# Patient Record
Sex: FEMALE | Race: WHITE
[De-identification: names, ages, dates, MRNs, and addresses within clinical notes are randomized per-mention and may not be internally consistent; named-entity substitution may affect disease eponyms.]

---

## 2020-08-02 ENCOUNTER — HOSPITAL ENCOUNTER (INPATIENT)
Dept: HOSPITAL 19 - MEDICAL | Age: 85
LOS: 11 days | Discharge: HOME HEALTH | DRG: 336 | End: 2020-08-13
Attending: INTERNAL MEDICINE | Admitting: INTERNAL MEDICINE
Payer: MEDICARE

## 2020-08-02 VITALS — DIASTOLIC BLOOD PRESSURE: 67 MMHG | TEMPERATURE: 98.7 F | SYSTOLIC BLOOD PRESSURE: 139 MMHG | HEART RATE: 80 BPM

## 2020-08-02 VITALS — DIASTOLIC BLOOD PRESSURE: 60 MMHG | SYSTOLIC BLOOD PRESSURE: 152 MMHG | TEMPERATURE: 99.5 F | HEART RATE: 84 BPM

## 2020-08-02 VITALS — DIASTOLIC BLOOD PRESSURE: 45 MMHG | HEART RATE: 86 BPM | SYSTOLIC BLOOD PRESSURE: 134 MMHG

## 2020-08-02 VITALS — HEIGHT: 60.98 IN | WEIGHT: 142.42 LBS | BODY MASS INDEX: 26.89 KG/M2

## 2020-08-02 VITALS — HEART RATE: 84 BPM | DIASTOLIC BLOOD PRESSURE: 98 MMHG | SYSTOLIC BLOOD PRESSURE: 156 MMHG

## 2020-08-02 VITALS — HEART RATE: 88 BPM | DIASTOLIC BLOOD PRESSURE: 43 MMHG | SYSTOLIC BLOOD PRESSURE: 158 MMHG

## 2020-08-02 VITALS — SYSTOLIC BLOOD PRESSURE: 146 MMHG | DIASTOLIC BLOOD PRESSURE: 45 MMHG | HEART RATE: 83 BPM

## 2020-08-02 VITALS — HEART RATE: 82 BPM | TEMPERATURE: 99.8 F | SYSTOLIC BLOOD PRESSURE: 159 MMHG | DIASTOLIC BLOOD PRESSURE: 58 MMHG

## 2020-08-02 VITALS — DIASTOLIC BLOOD PRESSURE: 48 MMHG | SYSTOLIC BLOOD PRESSURE: 140 MMHG | HEART RATE: 97 BPM

## 2020-08-02 DIAGNOSIS — E44.0: ICD-10-CM

## 2020-08-02 DIAGNOSIS — I10: ICD-10-CM

## 2020-08-02 DIAGNOSIS — E03.9: ICD-10-CM

## 2020-08-02 DIAGNOSIS — Z88.5: ICD-10-CM

## 2020-08-02 DIAGNOSIS — E78.5: ICD-10-CM

## 2020-08-02 DIAGNOSIS — Z88.0: ICD-10-CM

## 2020-08-02 DIAGNOSIS — Z90.710: ICD-10-CM

## 2020-08-02 DIAGNOSIS — I48.91: ICD-10-CM

## 2020-08-02 DIAGNOSIS — F41.9: ICD-10-CM

## 2020-08-02 DIAGNOSIS — Z90.49: ICD-10-CM

## 2020-08-02 DIAGNOSIS — Z88.2: ICD-10-CM

## 2020-08-02 DIAGNOSIS — N63.0: ICD-10-CM

## 2020-08-02 DIAGNOSIS — Z66: ICD-10-CM

## 2020-08-02 DIAGNOSIS — Z88.1: ICD-10-CM

## 2020-08-02 DIAGNOSIS — K56.52: Primary | ICD-10-CM

## 2020-08-02 DIAGNOSIS — J45.909: ICD-10-CM

## 2020-08-02 DIAGNOSIS — Z85.43: ICD-10-CM

## 2020-08-02 DIAGNOSIS — K56.7: ICD-10-CM

## 2020-08-02 DIAGNOSIS — K59.00: ICD-10-CM

## 2020-08-02 PROCEDURE — 0DNW0ZZ RELEASE PERITONEUM, OPEN APPROACH: ICD-10-PCS | Performed by: SURGERY

## 2020-08-02 PROCEDURE — A4314 CATH W/DRAINAGE 2-WAY LATEX: HCPCS

## 2020-08-02 PROCEDURE — A9284 NON-ELECTRONIC SPIROMETER: HCPCS

## 2020-08-02 PROCEDURE — C1751 CATH, INF, PER/CENT/MIDLINE: HCPCS

## 2020-08-03 VITALS — SYSTOLIC BLOOD PRESSURE: 141 MMHG | DIASTOLIC BLOOD PRESSURE: 58 MMHG | HEART RATE: 76 BPM | TEMPERATURE: 99.1 F

## 2020-08-03 VITALS — DIASTOLIC BLOOD PRESSURE: 59 MMHG | HEART RATE: 81 BPM | TEMPERATURE: 98.1 F | SYSTOLIC BLOOD PRESSURE: 116 MMHG

## 2020-08-03 VITALS — HEART RATE: 72 BPM | SYSTOLIC BLOOD PRESSURE: 126 MMHG | TEMPERATURE: 98.7 F | DIASTOLIC BLOOD PRESSURE: 48 MMHG

## 2020-08-03 VITALS — HEART RATE: 88 BPM | TEMPERATURE: 100 F | SYSTOLIC BLOOD PRESSURE: 153 MMHG | DIASTOLIC BLOOD PRESSURE: 49 MMHG

## 2020-08-03 VITALS — DIASTOLIC BLOOD PRESSURE: 50 MMHG | SYSTOLIC BLOOD PRESSURE: 147 MMHG | TEMPERATURE: 99.2 F | HEART RATE: 82 BPM

## 2020-08-03 VITALS — TEMPERATURE: 98.5 F | SYSTOLIC BLOOD PRESSURE: 161 MMHG | HEART RATE: 87 BPM | DIASTOLIC BLOOD PRESSURE: 37 MMHG

## 2020-08-03 VITALS — HEART RATE: 75 BPM | TEMPERATURE: 98.6 F | SYSTOLIC BLOOD PRESSURE: 130 MMHG | DIASTOLIC BLOOD PRESSURE: 49 MMHG

## 2020-08-03 VITALS — TEMPERATURE: 98.8 F | SYSTOLIC BLOOD PRESSURE: 135 MMHG | HEART RATE: 82 BPM | DIASTOLIC BLOOD PRESSURE: 44 MMHG

## 2020-08-03 LAB
ALBUMIN SERPL-MCNC: 3.4 GM/DL (ref 3.5–5)
ALP SERPL-CCNC: 69 U/L (ref 50–136)
ALT SERPL-CCNC: 18 U/L (ref 4–34)
ANION GAP SERPL CALC-SCNC: 6 MMOL/L (ref 7–16)
AST SERPL-CCNC: 48 U/L (ref 15–37)
BASOPHILS # BLD: 0 10*3/UL (ref 0–0.2)
BASOPHILS NFR BLD AUTO: 0.1 % (ref 0–2)
BILIRUB SERPL-MCNC: 0.6 MG/DL (ref 0–1)
BUN SERPL-MCNC: 18 MG/DL (ref 7–17)
CALCIUM SERPL-MCNC: 8.3 MG/DL (ref 8.4–10.2)
CHLORIDE SERPL-SCNC: 108 MMOL/L (ref 98–107)
CO2 SERPL-SCNC: 23 MMOL/L (ref 22–30)
CREAT SERPL-SCNC: 0.94 UMOL/L (ref 0.52–1.25)
EOSINOPHIL # BLD: 0 10*3/UL (ref 0–0.7)
EOSINOPHIL NFR BLD: 0 % (ref 0–4)
ERYTHROCYTE [DISTWIDTH] IN BLOOD BY AUTOMATED COUNT: 14 % (ref 11.5–14.5)
GLUCOSE SERPL-MCNC: 144 MG/DL (ref 74–106)
GRANULOCYTES # BLD AUTO: 85.4 % (ref 42.2–75.2)
HCT VFR BLD AUTO: 36.5 % (ref 37–47)
HGB BLD-MCNC: 11.6 G/DL (ref 12.5–16)
LYMPHOCYTES # BLD: 0.8 10*3/UL (ref 1.2–3.4)
LYMPHOCYTES NFR BLD: 5.9 % (ref 20–51)
MAGNESIUM SERPL-MCNC: 2 MG/DL (ref 1.6–2.3)
MCH RBC QN AUTO: 27 PG (ref 27–31)
MCHC RBC AUTO-ENTMCNC: 32 G/DL (ref 33–37)
MCV RBC AUTO: 85 FL (ref 80–100)
MONOCYTES # BLD: 1.1 10*3/UL (ref 0.1–0.6)
MONOCYTES NFR BLD AUTO: 8.2 % (ref 1.7–9.3)
NEUTROPHILS # BLD: 11.6 10*3/UL (ref 1.4–6.5)
PLATELET # BLD AUTO: 211 K/MM3 (ref 130–400)
PMV BLD AUTO: 11.3 FL (ref 7.4–10.4)
POTASSIUM SERPL-SCNC: 4.1 MMOL/L (ref 3.4–5)
PROT SERPL-MCNC: 6 GM/DL (ref 6.4–8.2)
RBC # BLD AUTO: 4.31 M/MM3 (ref 4.1–5.3)
SODIUM SERPL-SCNC: 137 MMOL/L (ref 137–145)

## 2020-08-04 VITALS — DIASTOLIC BLOOD PRESSURE: 58 MMHG | HEART RATE: 123 BPM | TEMPERATURE: 99.6 F | SYSTOLIC BLOOD PRESSURE: 127 MMHG

## 2020-08-04 VITALS — TEMPERATURE: 98.8 F | DIASTOLIC BLOOD PRESSURE: 53 MMHG | HEART RATE: 81 BPM | SYSTOLIC BLOOD PRESSURE: 130 MMHG

## 2020-08-04 VITALS — SYSTOLIC BLOOD PRESSURE: 118 MMHG | HEART RATE: 80 BPM | DIASTOLIC BLOOD PRESSURE: 53 MMHG | TEMPERATURE: 98.1 F

## 2020-08-04 VITALS — TEMPERATURE: 98.3 F | SYSTOLIC BLOOD PRESSURE: 140 MMHG | DIASTOLIC BLOOD PRESSURE: 74 MMHG | HEART RATE: 77 BPM

## 2020-08-04 VITALS — HEART RATE: 79 BPM

## 2020-08-04 VITALS — TEMPERATURE: 99.2 F | SYSTOLIC BLOOD PRESSURE: 119 MMHG | HEART RATE: 103 BPM | DIASTOLIC BLOOD PRESSURE: 58 MMHG

## 2020-08-04 VITALS — DIASTOLIC BLOOD PRESSURE: 61 MMHG | HEART RATE: 84 BPM | TEMPERATURE: 99.6 F | SYSTOLIC BLOOD PRESSURE: 127 MMHG

## 2020-08-04 LAB
ANION GAP SERPL CALC-SCNC: 5 MMOL/L (ref 7–16)
BASOPHILS # BLD: 0.1 10*3/UL (ref 0–0.2)
BASOPHILS NFR BLD AUTO: 0.7 % (ref 0–2)
BUN SERPL-MCNC: 20 MG/DL (ref 7–17)
CALCIUM SERPL-MCNC: 8.3 MG/DL (ref 8.4–10.2)
CHLORIDE SERPL-SCNC: 104 MMOL/L (ref 98–107)
CO2 SERPL-SCNC: 26 MMOL/L (ref 22–30)
CREAT SERPL-SCNC: 0.89 UMOL/L (ref 0.52–1.25)
EOSINOPHIL # BLD: 0.1 10*3/UL (ref 0–0.7)
EOSINOPHIL NFR BLD: 0.8 % (ref 0–4)
ERYTHROCYTE [DISTWIDTH] IN BLOOD BY AUTOMATED COUNT: 14.2 % (ref 11.5–14.5)
GLUCOSE SERPL-MCNC: 97 MG/DL (ref 74–106)
GRANULOCYTES # BLD AUTO: 68.3 % (ref 42.2–75.2)
HCT VFR BLD AUTO: 34.5 % (ref 37–47)
HGB BLD-MCNC: 10.6 G/DL (ref 12.5–16)
LYMPHOCYTES # BLD: 1.5 10*3/UL (ref 1.2–3.4)
LYMPHOCYTES NFR BLD: 19.3 % (ref 20–51)
MCH RBC QN AUTO: 27 PG (ref 27–31)
MCHC RBC AUTO-ENTMCNC: 31 G/DL (ref 33–37)
MCV RBC AUTO: 87 FL (ref 80–100)
MONOCYTES # BLD: 0.8 10*3/UL (ref 0.1–0.6)
MONOCYTES NFR BLD AUTO: 10.6 % (ref 1.7–9.3)
NEUTROPHILS # BLD: 5.3 10*3/UL (ref 1.4–6.5)
PLATELET # BLD AUTO: 180 K/MM3 (ref 130–400)
PMV BLD AUTO: 10.7 FL (ref 7.4–10.4)
POTASSIUM SERPL-SCNC: 4.2 MMOL/L (ref 3.4–5)
RBC # BLD AUTO: 3.98 M/MM3 (ref 4.1–5.3)
SODIUM SERPL-SCNC: 135 MMOL/L (ref 137–145)

## 2020-08-05 VITALS — DIASTOLIC BLOOD PRESSURE: 63 MMHG | SYSTOLIC BLOOD PRESSURE: 173 MMHG | TEMPERATURE: 99.1 F | HEART RATE: 89 BPM

## 2020-08-05 VITALS — SYSTOLIC BLOOD PRESSURE: 185 MMHG | TEMPERATURE: 100.3 F | DIASTOLIC BLOOD PRESSURE: 73 MMHG | HEART RATE: 87 BPM

## 2020-08-05 VITALS — HEART RATE: 77 BPM | TEMPERATURE: 99 F | SYSTOLIC BLOOD PRESSURE: 155 MMHG | DIASTOLIC BLOOD PRESSURE: 64 MMHG

## 2020-08-05 VITALS — TEMPERATURE: 99.1 F | DIASTOLIC BLOOD PRESSURE: 64 MMHG | HEART RATE: 91 BPM | SYSTOLIC BLOOD PRESSURE: 176 MMHG

## 2020-08-05 VITALS — HEART RATE: 84 BPM | SYSTOLIC BLOOD PRESSURE: 165 MMHG | TEMPERATURE: 99.8 F | DIASTOLIC BLOOD PRESSURE: 54 MMHG

## 2020-08-05 VITALS — DIASTOLIC BLOOD PRESSURE: 56 MMHG | TEMPERATURE: 98.7 F | SYSTOLIC BLOOD PRESSURE: 139 MMHG | HEART RATE: 88 BPM

## 2020-08-05 VITALS — TEMPERATURE: 99.1 F

## 2020-08-05 LAB
ANION GAP SERPL CALC-SCNC: 5 MMOL/L (ref 7–16)
BASOPHILS # BLD: 0 10*3/UL (ref 0–0.2)
BASOPHILS NFR BLD AUTO: 0.4 % (ref 0–2)
BUN SERPL-MCNC: 22 MG/DL (ref 7–17)
CALCIUM SERPL-MCNC: 8.4 MG/DL (ref 8.4–10.2)
CHLORIDE SERPL-SCNC: 102 MMOL/L (ref 98–107)
CO2 SERPL-SCNC: 26 MMOL/L (ref 22–30)
CREAT SERPL-SCNC: 0.96 UMOL/L (ref 0.52–1.25)
EOSINOPHIL # BLD: 0.1 10*3/UL (ref 0–0.7)
EOSINOPHIL NFR BLD: 0.7 % (ref 0–4)
ERYTHROCYTE [DISTWIDTH] IN BLOOD BY AUTOMATED COUNT: 13.9 % (ref 11.5–14.5)
GLUCOSE SERPL-MCNC: 126 MG/DL (ref 74–106)
GRANULOCYTES # BLD AUTO: 74.6 % (ref 42.2–75.2)
HCT VFR BLD AUTO: 37.7 % (ref 37–47)
HGB BLD-MCNC: 11.6 G/DL (ref 12.5–16)
LYMPHOCYTES # BLD: 0.9 10*3/UL (ref 1.2–3.4)
LYMPHOCYTES NFR BLD: 12.6 % (ref 20–51)
MCH RBC QN AUTO: 26 PG (ref 27–31)
MCHC RBC AUTO-ENTMCNC: 31 G/DL (ref 33–37)
MCV RBC AUTO: 86 FL (ref 80–100)
MONOCYTES # BLD: 0.8 10*3/UL (ref 0.1–0.6)
MONOCYTES NFR BLD AUTO: 11.4 % (ref 1.7–9.3)
NEUTROPHILS # BLD: 5.2 10*3/UL (ref 1.4–6.5)
PLATELET # BLD AUTO: 209 K/MM3 (ref 130–400)
PMV BLD AUTO: 10.8 FL (ref 7.4–10.4)
POTASSIUM SERPL-SCNC: 4.6 MMOL/L (ref 3.4–5)
RBC # BLD AUTO: 4.39 M/MM3 (ref 4.1–5.3)
SODIUM SERPL-SCNC: 134 MMOL/L (ref 137–145)

## 2020-08-06 VITALS — OXYGEN SATURATION: 89 %

## 2020-08-06 VITALS — OXYGEN SATURATION: 92 %

## 2020-08-06 VITALS — DIASTOLIC BLOOD PRESSURE: 85 MMHG | SYSTOLIC BLOOD PRESSURE: 119 MMHG | HEART RATE: 113 BPM | TEMPERATURE: 99.6 F

## 2020-08-06 VITALS — OXYGEN SATURATION: 90 %

## 2020-08-06 VITALS — OXYGEN SATURATION: 94 %

## 2020-08-06 VITALS — HEART RATE: 64 BPM | SYSTOLIC BLOOD PRESSURE: 148 MMHG | DIASTOLIC BLOOD PRESSURE: 56 MMHG | TEMPERATURE: 98 F

## 2020-08-06 VITALS — DIASTOLIC BLOOD PRESSURE: 77 MMHG | HEART RATE: 98 BPM | SYSTOLIC BLOOD PRESSURE: 183 MMHG | TEMPERATURE: 98.9 F

## 2020-08-06 VITALS — OXYGEN SATURATION: 91 %

## 2020-08-06 VITALS — HEART RATE: 102 BPM | SYSTOLIC BLOOD PRESSURE: 138 MMHG | OXYGEN SATURATION: 95 % | DIASTOLIC BLOOD PRESSURE: 67 MMHG

## 2020-08-06 VITALS — OXYGEN SATURATION: 87 %

## 2020-08-06 VITALS
SYSTOLIC BLOOD PRESSURE: 159 MMHG | TEMPERATURE: 98.6 F | DIASTOLIC BLOOD PRESSURE: 89 MMHG | HEART RATE: 120 BPM | OXYGEN SATURATION: 92 %

## 2020-08-06 VITALS — DIASTOLIC BLOOD PRESSURE: 75 MMHG | HEART RATE: 83 BPM | TEMPERATURE: 99.9 F | SYSTOLIC BLOOD PRESSURE: 157 MMHG

## 2020-08-06 VITALS — OXYGEN SATURATION: 88 %

## 2020-08-06 VITALS — TEMPERATURE: 99.1 F | HEART RATE: 108 BPM | DIASTOLIC BLOOD PRESSURE: 57 MMHG | SYSTOLIC BLOOD PRESSURE: 145 MMHG

## 2020-08-06 VITALS — HEART RATE: 97 BPM | SYSTOLIC BLOOD PRESSURE: 149 MMHG | DIASTOLIC BLOOD PRESSURE: 65 MMHG | TEMPERATURE: 98.7 F

## 2020-08-06 VITALS — OXYGEN SATURATION: 93 %

## 2020-08-06 VITALS — OXYGEN SATURATION: 95 %

## 2020-08-06 VITALS — OXYGEN SATURATION: 96 %

## 2020-08-06 LAB
ANION GAP SERPL CALC-SCNC: 11 MMOL/L (ref 7–16)
BASOPHILS # BLD: 0 10*3/UL (ref 0–0.2)
BASOPHILS NFR BLD AUTO: 0.3 % (ref 0–2)
BUN SERPL-MCNC: 26 MG/DL (ref 7–17)
CALCIUM SERPL-MCNC: 8.4 MG/DL (ref 8.4–10.2)
CHLORIDE SERPL-SCNC: 101 MMOL/L (ref 98–107)
CO2 SERPL-SCNC: 24 MMOL/L (ref 22–30)
CREAT SERPL-SCNC: 0.88 UMOL/L (ref 0.52–1.25)
EOSINOPHIL # BLD: 0 10*3/UL (ref 0–0.7)
EOSINOPHIL NFR BLD: 0 % (ref 0–4)
ERYTHROCYTE [DISTWIDTH] IN BLOOD BY AUTOMATED COUNT: 13.5 % (ref 11.5–14.5)
GASTROCULT GAST QL: POSITIVE
GLUCOSE SERPL-MCNC: 140 MG/DL (ref 74–106)
GRANULOCYTES # BLD AUTO: 89.8 % (ref 42.2–75.2)
HCT VFR BLD AUTO: 38.8 % (ref 37–47)
HGB BLD-MCNC: 12.6 G/DL (ref 12.5–16)
LYMPHOCYTES # BLD: 0.5 10*3/UL (ref 1.2–3.4)
LYMPHOCYTES NFR BLD: 3.8 % (ref 20–51)
MCH RBC QN AUTO: 28 PG (ref 27–31)
MCHC RBC AUTO-ENTMCNC: 33 G/DL (ref 33–37)
MCV RBC AUTO: 85 FL (ref 80–100)
MONOCYTES # BLD: 0.8 10*3/UL (ref 0.1–0.6)
MONOCYTES NFR BLD AUTO: 5.7 % (ref 1.7–9.3)
NEUTROPHILS # BLD: 12.4 10*3/UL (ref 1.4–6.5)
PH GAST: 1 [PH]
PLATELET # BLD AUTO: 245 K/MM3 (ref 130–400)
PMV BLD AUTO: 10.1 FL (ref 7.4–10.4)
POTASSIUM SERPL-SCNC: 3.5 MMOL/L (ref 3.4–5)
RBC # BLD AUTO: 4.58 M/MM3 (ref 4.1–5.3)
SODIUM SERPL-SCNC: 137 MMOL/L (ref 137–145)

## 2020-08-06 PROCEDURE — 02HV33Z INSERTION OF INFUSION DEVICE INTO SUPERIOR VENA CAVA, PERCUTANEOUS APPROACH: ICD-10-PCS

## 2020-08-06 PROCEDURE — 3E0436Z INTRODUCTION OF NUTRITIONAL SUBSTANCE INTO CENTRAL VEIN, PERCUTANEOUS APPROACH: ICD-10-PCS

## 2020-08-07 VITALS — OXYGEN SATURATION: 96 %

## 2020-08-07 VITALS — SYSTOLIC BLOOD PRESSURE: 154 MMHG | HEART RATE: 80 BPM | DIASTOLIC BLOOD PRESSURE: 68 MMHG | TEMPERATURE: 98 F

## 2020-08-07 VITALS — OXYGEN SATURATION: 95 %

## 2020-08-07 VITALS — OXYGEN SATURATION: 93 %

## 2020-08-07 VITALS — OXYGEN SATURATION: 91 %

## 2020-08-07 VITALS — OXYGEN SATURATION: 94 %

## 2020-08-07 VITALS — OXYGEN SATURATION: 92 %

## 2020-08-07 VITALS — OXYGEN SATURATION: 97 %

## 2020-08-07 VITALS — OXYGEN SATURATION: 90 %

## 2020-08-07 VITALS — OXYGEN SATURATION: 100 %

## 2020-08-07 VITALS — OXYGEN SATURATION: 87 %

## 2020-08-07 VITALS
DIASTOLIC BLOOD PRESSURE: 77 MMHG | HEART RATE: 103 BPM | TEMPERATURE: 98.5 F | OXYGEN SATURATION: 92 % | SYSTOLIC BLOOD PRESSURE: 132 MMHG

## 2020-08-07 VITALS — TEMPERATURE: 97.7 F | HEART RATE: 80 BPM | DIASTOLIC BLOOD PRESSURE: 78 MMHG | SYSTOLIC BLOOD PRESSURE: 164 MMHG

## 2020-08-07 VITALS — OXYGEN SATURATION: 89 %

## 2020-08-07 VITALS — OXYGEN SATURATION: 99 %

## 2020-08-07 VITALS — SYSTOLIC BLOOD PRESSURE: 159 MMHG | TEMPERATURE: 99 F | DIASTOLIC BLOOD PRESSURE: 70 MMHG | HEART RATE: 62 BPM

## 2020-08-07 VITALS — OXYGEN SATURATION: 88 %

## 2020-08-07 VITALS
TEMPERATURE: 98.7 F | DIASTOLIC BLOOD PRESSURE: 64 MMHG | SYSTOLIC BLOOD PRESSURE: 155 MMHG | OXYGEN SATURATION: 94 % | HEART RATE: 77 BPM

## 2020-08-07 VITALS — SYSTOLIC BLOOD PRESSURE: 159 MMHG | TEMPERATURE: 98.9 F | DIASTOLIC BLOOD PRESSURE: 70 MMHG | HEART RATE: 66 BPM

## 2020-08-07 VITALS — OXYGEN SATURATION: 98 %

## 2020-08-07 VITALS — OXYGEN SATURATION: 86 %

## 2020-08-07 LAB
ALBUMIN SERPL-MCNC: 2.9 GM/DL (ref 3.5–5)
ALP SERPL-CCNC: 64 U/L (ref 50–136)
ALT SERPL-CCNC: 17 U/L (ref 4–34)
ANION GAP SERPL CALC-SCNC: 5 MMOL/L (ref 7–16)
ANION GAP SERPL CALC-SCNC: 6 MMOL/L (ref 7–16)
AST SERPL-CCNC: 58 U/L (ref 15–37)
BASOPHILS # BLD: 0 10*3/UL (ref 0–0.2)
BASOPHILS NFR BLD AUTO: 0.3 % (ref 0–2)
BILIRUB SERPL-MCNC: 0.7 MG/DL (ref 0–1)
BUN SERPL-MCNC: 21 MG/DL (ref 7–17)
BUN SERPL-MCNC: 21 MG/DL (ref 7–17)
CALCIUM SERPL-MCNC: 7.9 MG/DL (ref 8.4–10.2)
CALCIUM SERPL-MCNC: 8.1 MG/DL (ref 8.4–10.2)
CHLORIDE SERPL-SCNC: 100 MMOL/L (ref 98–107)
CHLORIDE SERPL-SCNC: 99 MMOL/L (ref 98–107)
CHOLEST SPEC-SCNC: 102 MG/DL (ref 120–200)
CO2 SERPL-SCNC: 29 MMOL/L (ref 22–30)
CO2 SERPL-SCNC: 30 MMOL/L (ref 22–30)
CREAT SERPL-SCNC: 0.77 UMOL/L (ref 0.52–1.25)
CREAT SERPL-SCNC: 0.79 UMOL/L (ref 0.52–1.25)
EOSINOPHIL # BLD: 0 10*3/UL (ref 0–0.7)
EOSINOPHIL NFR BLD: 0.3 % (ref 0–4)
ERYTHROCYTE [DISTWIDTH] IN BLOOD BY AUTOMATED COUNT: 13.4 % (ref 11.5–14.5)
GLUCOSE SERPL-MCNC: 102 MG/DL (ref 74–106)
GLUCOSE SERPL-MCNC: 108 MG/DL (ref 74–106)
GRANULOCYTES # BLD AUTO: 77.9 % (ref 42.2–75.2)
HCT VFR BLD AUTO: 34.7 % (ref 37–47)
HGB BLD-MCNC: 11 G/DL (ref 12.5–16)
LYMPHOCYTES # BLD: 1.1 10*3/UL (ref 1.2–3.4)
LYMPHOCYTES NFR BLD: 9.8 % (ref 20–51)
MCH RBC QN AUTO: 27 PG (ref 27–31)
MCHC RBC AUTO-ENTMCNC: 32 G/DL (ref 33–37)
MCV RBC AUTO: 85 FL (ref 80–100)
MONOCYTES # BLD: 1.2 10*3/UL (ref 0.1–0.6)
MONOCYTES NFR BLD AUTO: 11.3 % (ref 1.7–9.3)
NEUTROPHILS # BLD: 8.4 10*3/UL (ref 1.4–6.5)
PHOSPHATE SERPL-MCNC: 2.9 MG/DL (ref 2.5–4.5)
PLATELET # BLD AUTO: 207 K/MM3 (ref 130–400)
PMV BLD AUTO: 10.4 FL (ref 7.4–10.4)
POTASSIUM SERPL-SCNC: 3.7 MMOL/L (ref 3.4–5)
POTASSIUM SERPL-SCNC: 3.8 MMOL/L (ref 3.4–5)
PRE ALBUMIN: 9.3 MG/DL (ref 17.6–36)
PROT SERPL-MCNC: 5.2 GM/DL (ref 6.4–8.2)
RBC # BLD AUTO: 4.1 M/MM3 (ref 4.1–5.3)
SODIUM SERPL-SCNC: 134 MMOL/L (ref 137–145)
SODIUM SERPL-SCNC: 135 MMOL/L (ref 137–145)
TRIGL SERPL-MCNC: 97 MG/DL

## 2020-08-08 VITALS — OXYGEN SATURATION: 95 %

## 2020-08-08 VITALS — OXYGEN SATURATION: 93 %

## 2020-08-08 VITALS — OXYGEN SATURATION: 98 %

## 2020-08-08 VITALS — OXYGEN SATURATION: 96 %

## 2020-08-08 VITALS — OXYGEN SATURATION: 97 %

## 2020-08-08 VITALS — OXYGEN SATURATION: 99 %

## 2020-08-08 VITALS — OXYGEN SATURATION: 94 %

## 2020-08-08 VITALS — OXYGEN SATURATION: 100 %

## 2020-08-08 VITALS
TEMPERATURE: 98.8 F | HEART RATE: 59 BPM | SYSTOLIC BLOOD PRESSURE: 159 MMHG | DIASTOLIC BLOOD PRESSURE: 70 MMHG | OXYGEN SATURATION: 93 %

## 2020-08-08 VITALS
DIASTOLIC BLOOD PRESSURE: 71 MMHG | TEMPERATURE: 98.7 F | SYSTOLIC BLOOD PRESSURE: 168 MMHG | OXYGEN SATURATION: 94 % | HEART RATE: 61 BPM

## 2020-08-08 VITALS
HEART RATE: 78 BPM | DIASTOLIC BLOOD PRESSURE: 78 MMHG | SYSTOLIC BLOOD PRESSURE: 126 MMHG | OXYGEN SATURATION: 94 % | TEMPERATURE: 98 F

## 2020-08-08 VITALS — OXYGEN SATURATION: 92 %

## 2020-08-08 VITALS — OXYGEN SATURATION: 88 %

## 2020-08-08 VITALS
HEART RATE: 75 BPM | DIASTOLIC BLOOD PRESSURE: 78 MMHG | OXYGEN SATURATION: 93 % | TEMPERATURE: 97.8 F | SYSTOLIC BLOOD PRESSURE: 120 MMHG

## 2020-08-08 VITALS — TEMPERATURE: 98.5 F | HEART RATE: 60 BPM | SYSTOLIC BLOOD PRESSURE: 154 MMHG | DIASTOLIC BLOOD PRESSURE: 62 MMHG

## 2020-08-08 VITALS — OXYGEN SATURATION: 91 %

## 2020-08-08 VITALS — DIASTOLIC BLOOD PRESSURE: 61 MMHG | OXYGEN SATURATION: 97 % | SYSTOLIC BLOOD PRESSURE: 134 MMHG | HEART RATE: 61 BPM

## 2020-08-08 VITALS — OXYGEN SATURATION: 90 %

## 2020-08-08 VITALS — OXYGEN SATURATION: 85 %

## 2020-08-08 LAB
ANION GAP SERPL CALC-SCNC: 4 MMOL/L (ref 7–16)
BUN SERPL-MCNC: 20 MG/DL (ref 7–17)
CALCIUM SERPL-MCNC: 7.8 MG/DL (ref 8.4–10.2)
CHLORIDE SERPL-SCNC: 99 MMOL/L (ref 98–107)
CO2 SERPL-SCNC: 30 MMOL/L (ref 22–30)
CREAT SERPL-SCNC: 0.76 UMOL/L (ref 0.52–1.25)
GLUCOSE SERPL-MCNC: 123 MG/DL (ref 74–106)
MAGNESIUM SERPL-MCNC: 2.1 MG/DL (ref 1.6–2.3)
PHOSPHATE SERPL-MCNC: 2.6 MG/DL (ref 2.5–4.5)
POTASSIUM SERPL-SCNC: 4 MMOL/L (ref 3.4–5)
SODIUM SERPL-SCNC: 133 MMOL/L (ref 137–145)

## 2020-08-09 VITALS — OXYGEN SATURATION: 97 %

## 2020-08-09 VITALS — OXYGEN SATURATION: 95 %

## 2020-08-09 VITALS — OXYGEN SATURATION: 93 %

## 2020-08-09 VITALS — OXYGEN SATURATION: 96 %

## 2020-08-09 VITALS — OXYGEN SATURATION: 94 %

## 2020-08-09 VITALS
SYSTOLIC BLOOD PRESSURE: 151 MMHG | DIASTOLIC BLOOD PRESSURE: 64 MMHG | OXYGEN SATURATION: 97 % | TEMPERATURE: 98.2 F | HEART RATE: 61 BPM

## 2020-08-09 VITALS — TEMPERATURE: 98.7 F | DIASTOLIC BLOOD PRESSURE: 54 MMHG | HEART RATE: 75 BPM | SYSTOLIC BLOOD PRESSURE: 146 MMHG

## 2020-08-09 VITALS — OXYGEN SATURATION: 92 %

## 2020-08-09 VITALS — OXYGEN SATURATION: 98 %

## 2020-08-09 VITALS — SYSTOLIC BLOOD PRESSURE: 159 MMHG | HEART RATE: 69 BPM | DIASTOLIC BLOOD PRESSURE: 55 MMHG | TEMPERATURE: 98.4 F

## 2020-08-09 VITALS — OXYGEN SATURATION: 99 %

## 2020-08-09 VITALS — HEART RATE: 78 BPM | DIASTOLIC BLOOD PRESSURE: 84 MMHG | TEMPERATURE: 97.7 F | SYSTOLIC BLOOD PRESSURE: 148 MMHG

## 2020-08-09 VITALS — SYSTOLIC BLOOD PRESSURE: 131 MMHG | TEMPERATURE: 98.7 F | DIASTOLIC BLOOD PRESSURE: 63 MMHG | HEART RATE: 61 BPM

## 2020-08-09 VITALS — DIASTOLIC BLOOD PRESSURE: 68 MMHG | SYSTOLIC BLOOD PRESSURE: 156 MMHG | HEART RATE: 63 BPM | TEMPERATURE: 97.8 F

## 2020-08-09 LAB
ANION GAP SERPL CALC-SCNC: 2 MMOL/L (ref 7–16)
BUN SERPL-MCNC: 22 MG/DL (ref 7–17)
CALCIUM SERPL-MCNC: 7.4 MG/DL (ref 8.4–10.2)
CHLORIDE SERPL-SCNC: 102 MMOL/L (ref 98–107)
CO2 SERPL-SCNC: 30 MMOL/L (ref 22–30)
CREAT SERPL-SCNC: 0.72 UMOL/L (ref 0.52–1.25)
GLUCOSE SERPL-MCNC: 106 MG/DL (ref 74–106)
MAGNESIUM SERPL-MCNC: 2.2 MG/DL (ref 1.6–2.3)
PHOSPHATE SERPL-MCNC: 3.7 MG/DL (ref 2.5–4.5)
POTASSIUM SERPL-SCNC: 4.1 MMOL/L (ref 3.4–5)
SODIUM SERPL-SCNC: 133 MMOL/L (ref 137–145)

## 2020-08-10 VITALS — TEMPERATURE: 97.8 F | HEART RATE: 65 BPM | SYSTOLIC BLOOD PRESSURE: 139 MMHG | DIASTOLIC BLOOD PRESSURE: 51 MMHG

## 2020-08-10 VITALS — DIASTOLIC BLOOD PRESSURE: 50 MMHG | TEMPERATURE: 98.7 F | SYSTOLIC BLOOD PRESSURE: 142 MMHG | HEART RATE: 65 BPM

## 2020-08-10 VITALS — SYSTOLIC BLOOD PRESSURE: 158 MMHG | TEMPERATURE: 98.1 F | DIASTOLIC BLOOD PRESSURE: 55 MMHG | HEART RATE: 72 BPM

## 2020-08-10 VITALS — DIASTOLIC BLOOD PRESSURE: 54 MMHG | TEMPERATURE: 98.5 F | SYSTOLIC BLOOD PRESSURE: 146 MMHG | HEART RATE: 75 BPM

## 2020-08-10 VITALS — SYSTOLIC BLOOD PRESSURE: 154 MMHG | HEART RATE: 67 BPM | TEMPERATURE: 98.5 F | DIASTOLIC BLOOD PRESSURE: 50 MMHG

## 2020-08-10 VITALS — HEART RATE: 69 BPM | SYSTOLIC BLOOD PRESSURE: 155 MMHG | DIASTOLIC BLOOD PRESSURE: 51 MMHG | TEMPERATURE: 98.1 F

## 2020-08-10 VITALS — DIASTOLIC BLOOD PRESSURE: 57 MMHG | SYSTOLIC BLOOD PRESSURE: 147 MMHG | HEART RATE: 74 BPM | TEMPERATURE: 97.8 F

## 2020-08-10 LAB
ANION GAP SERPL CALC-SCNC: 2 MMOL/L (ref 7–16)
BUN SERPL-MCNC: 23 MG/DL (ref 7–17)
CALCIUM SERPL-MCNC: 7.7 MG/DL (ref 8.4–10.2)
CHLORIDE SERPL-SCNC: 103 MMOL/L (ref 98–107)
CO2 SERPL-SCNC: 29 MMOL/L (ref 22–30)
CREAT SERPL-SCNC: 0.76 UMOL/L (ref 0.52–1.25)
GLUCOSE SERPL-MCNC: 90 MG/DL (ref 74–106)
MAGNESIUM SERPL-MCNC: 2.2 MG/DL (ref 1.6–2.3)
PHOSPHATE SERPL-MCNC: 3.1 MG/DL (ref 2.5–4.5)
POTASSIUM SERPL-SCNC: 3.9 MMOL/L (ref 3.4–5)
SODIUM SERPL-SCNC: 135 MMOL/L (ref 137–145)

## 2020-08-11 VITALS — TEMPERATURE: 99 F | DIASTOLIC BLOOD PRESSURE: 54 MMHG | HEART RATE: 68 BPM | SYSTOLIC BLOOD PRESSURE: 137 MMHG

## 2020-08-11 VITALS — SYSTOLIC BLOOD PRESSURE: 169 MMHG | TEMPERATURE: 98.9 F | DIASTOLIC BLOOD PRESSURE: 55 MMHG | HEART RATE: 72 BPM

## 2020-08-11 VITALS — TEMPERATURE: 98.3 F | SYSTOLIC BLOOD PRESSURE: 161 MMHG | DIASTOLIC BLOOD PRESSURE: 47 MMHG | HEART RATE: 72 BPM

## 2020-08-11 VITALS — DIASTOLIC BLOOD PRESSURE: 51 MMHG | TEMPERATURE: 98.2 F | SYSTOLIC BLOOD PRESSURE: 137 MMHG | HEART RATE: 59 BPM

## 2020-08-11 VITALS — SYSTOLIC BLOOD PRESSURE: 139 MMHG | TEMPERATURE: 99.3 F | DIASTOLIC BLOOD PRESSURE: 51 MMHG | HEART RATE: 61 BPM

## 2020-08-11 VITALS — SYSTOLIC BLOOD PRESSURE: 138 MMHG | DIASTOLIC BLOOD PRESSURE: 84 MMHG | HEART RATE: 74 BPM | TEMPERATURE: 98.1 F

## 2020-08-11 LAB
ANION GAP SERPL CALC-SCNC: 3 MMOL/L (ref 7–16)
BUN SERPL-MCNC: 20 MG/DL (ref 7–17)
CALCIUM SERPL-MCNC: 7.5 MG/DL (ref 8.4–10.2)
CHLORIDE SERPL-SCNC: 106 MMOL/L (ref 98–107)
CO2 SERPL-SCNC: 26 MMOL/L (ref 22–30)
CREAT SERPL-SCNC: 0.73 UMOL/L (ref 0.52–1.25)
GLUCOSE SERPL-MCNC: 98 MG/DL (ref 74–106)
MAGNESIUM SERPL-MCNC: 2.2 MG/DL (ref 1.6–2.3)
PHOSPHATE SERPL-MCNC: 3.4 MG/DL (ref 2.5–4.5)
POTASSIUM SERPL-SCNC: 3.6 MMOL/L (ref 3.4–5)
SODIUM SERPL-SCNC: 134 MMOL/L (ref 137–145)

## 2020-08-12 VITALS — SYSTOLIC BLOOD PRESSURE: 131 MMHG | TEMPERATURE: 98.1 F | HEART RATE: 65 BPM | DIASTOLIC BLOOD PRESSURE: 44 MMHG

## 2020-08-12 VITALS — HEART RATE: 67 BPM | DIASTOLIC BLOOD PRESSURE: 51 MMHG | SYSTOLIC BLOOD PRESSURE: 149 MMHG | TEMPERATURE: 98.3 F

## 2020-08-12 VITALS — DIASTOLIC BLOOD PRESSURE: 62 MMHG | TEMPERATURE: 97.9 F | SYSTOLIC BLOOD PRESSURE: 146 MMHG | HEART RATE: 62 BPM

## 2020-08-12 VITALS — SYSTOLIC BLOOD PRESSURE: 137 MMHG | HEART RATE: 60 BPM | DIASTOLIC BLOOD PRESSURE: 64 MMHG | TEMPERATURE: 98.3 F

## 2020-08-12 VITALS — TEMPERATURE: 98.5 F | DIASTOLIC BLOOD PRESSURE: 49 MMHG | SYSTOLIC BLOOD PRESSURE: 138 MMHG | HEART RATE: 60 BPM

## 2020-08-12 LAB
ALBUMIN SERPL-MCNC: 2.6 GM/DL (ref 3.5–5)
ALP SERPL-CCNC: 62 U/L (ref 50–136)
ALT SERPL-CCNC: 24 U/L (ref 4–34)
ANION GAP SERPL CALC-SCNC: 4 MMOL/L (ref 7–16)
AST SERPL-CCNC: 27 U/L (ref 15–37)
BILIRUB SERPL-MCNC: 0.3 MG/DL (ref 0–1)
BUN SERPL-MCNC: 17 MG/DL (ref 7–17)
BURR CELLS BLD QL SMEAR: (no result)
CALCIUM SERPL-MCNC: 7.4 MG/DL (ref 8.4–10.2)
CHLORIDE SERPL-SCNC: 108 MMOL/L (ref 98–107)
CHOLEST SPEC-SCNC: 103 MG/DL (ref 120–200)
CO2 SERPL-SCNC: 23 MMOL/L (ref 22–30)
CREAT SERPL-SCNC: 0.71 UMOL/L (ref 0.52–1.25)
EOSINOPHIL NFR BLD: 5 % (ref 0–4)
ERYTHROCYTE [DISTWIDTH] IN BLOOD BY AUTOMATED COUNT: 14 % (ref 11.5–14.5)
GLUCOSE SERPL-MCNC: 87 MG/DL (ref 74–106)
HCT VFR BLD AUTO: 30.1 % (ref 37–47)
HGB BLD-MCNC: 9.4 G/DL (ref 12.5–16)
HYPOCHROMIA BLD QL SMEAR: (no result)
LYMPHOCYTES NFR BLD MANUAL: 29 % (ref 20–51)
MAGNESIUM SERPL-MCNC: 2.2 MG/DL (ref 1.6–2.3)
MCH RBC QN AUTO: 26 PG (ref 27–31)
MCHC RBC AUTO-ENTMCNC: 31 G/DL (ref 33–37)
MCV RBC AUTO: 85 FL (ref 80–100)
METAMYELOCYTES NFR BLD MANUAL: 1 % (ref 0–0)
MONOCYTES NFR BLD: 4 % (ref 1.7–9.3)
NEUTS BAND NFR BLD: 5 % (ref 0–10)
NEUTS SEG NFR BLD MANUAL: 56 % (ref 42–75.2)
PHOSPHATE SERPL-MCNC: 3.7 MG/DL (ref 2.5–4.5)
PLATELET # BLD AUTO: 275 K/MM3 (ref 130–400)
PLATELET BLD QL SMEAR: NORMAL
PMV BLD AUTO: 10.7 FL (ref 7.4–10.4)
POTASSIUM SERPL-SCNC: 3.8 MMOL/L (ref 3.4–5)
PRE ALBUMIN: 12.9 MG/DL (ref 17.6–36)
PROT SERPL-MCNC: 5 GM/DL (ref 6.4–8.2)
RBC # BLD AUTO: 3.55 M/MM3 (ref 4.1–5.3)
SCHISTOCYTES BLD QL SMEAR: (no result)
SODIUM SERPL-SCNC: 135 MMOL/L (ref 137–145)
SPECIMEN VOL 12H UR: 0.38 L
TRIGL SERPL-MCNC: 65 MG/DL
UREA 24H UR-SCNC: 3 G/12 HR

## 2020-08-13 VITALS — HEART RATE: 61 BPM | SYSTOLIC BLOOD PRESSURE: 129 MMHG | DIASTOLIC BLOOD PRESSURE: 46 MMHG | TEMPERATURE: 97.9 F

## 2020-08-13 VITALS — DIASTOLIC BLOOD PRESSURE: 51 MMHG | SYSTOLIC BLOOD PRESSURE: 140 MMHG | TEMPERATURE: 97.8 F | HEART RATE: 66 BPM

## 2020-08-13 VITALS — HEART RATE: 61 BPM | SYSTOLIC BLOOD PRESSURE: 154 MMHG | TEMPERATURE: 97.9 F | DIASTOLIC BLOOD PRESSURE: 50 MMHG

## 2020-08-13 LAB
ANION GAP SERPL CALC-SCNC: 4 MMOL/L (ref 7–16)
BUN SERPL-MCNC: 17 MG/DL (ref 7–17)
CALCIUM SERPL-MCNC: 7.7 MG/DL (ref 8.4–10.2)
CHLORIDE SERPL-SCNC: 107 MMOL/L (ref 98–107)
CO2 SERPL-SCNC: 24 MMOL/L (ref 22–30)
CREAT SERPL-SCNC: 0.72 UMOL/L (ref 0.52–1.25)
EOSINOPHIL NFR BLD: 4 % (ref 0–4)
ERYTHROCYTE [DISTWIDTH] IN BLOOD BY AUTOMATED COUNT: 14.5 % (ref 11.5–14.5)
GLUCOSE SERPL-MCNC: 84 MG/DL (ref 74–106)
HCT VFR BLD AUTO: 31 % (ref 37–47)
HGB BLD-MCNC: 9.8 G/DL (ref 12.5–16)
LYMPHOCYTES NFR BLD MANUAL: 22 % (ref 20–51)
MCH RBC QN AUTO: 27 PG (ref 27–31)
MCHC RBC AUTO-ENTMCNC: 32 G/DL (ref 33–37)
MCV RBC AUTO: 85 FL (ref 80–100)
METAMYELOCYTES NFR BLD MANUAL: 2 % (ref 0–0)
MONOCYTES NFR BLD: 9 % (ref 1.7–9.3)
NEUTS BAND NFR BLD: 9 % (ref 0–10)
NEUTS SEG NFR BLD MANUAL: 54 % (ref 42–75.2)
PLATELET # BLD AUTO: 302 K/MM3 (ref 130–400)
PLATELET BLD QL SMEAR: NORMAL
PMV BLD AUTO: 10.3 FL (ref 7.4–10.4)
POTASSIUM SERPL-SCNC: 4.3 MMOL/L (ref 3.4–5)
RBC # BLD AUTO: 3.64 M/MM3 (ref 4.1–5.3)
SODIUM SERPL-SCNC: 135 MMOL/L (ref 137–145)

## 2020-08-13 NOTE — NUR
attended clinical rounds with the team. The patient had an NG
tube placed. Will continue to monitor.
 attended clinical rounds with the team. The patient's 
was present.  After rounds, SW met with the patient and the patient's 
to revisit the discharge plan. The patient and her  would rather go
home with [facility] and not SNF at [place]. Will continue to follow.
 attented clinical rounds with the team. After rounds, SW met
with the patient to complete initial intake. The patient lives in [place] [facility] with her , Miguel # [phone]. The patient's son is a source of
support too. The patient uses a walker and her  assist with showers.
The patient is not interested in [facility] at this time. The patient's PCP is Dr. Vargas and patient receives medications from [facility] in .  The patient
does not have advanced directives. The patient plans to return home at
discharge with her  providing transportation.  WALDEMAR contacted Miguel to
discuss this plan. He was in agreeance. PT is recommending home at this time.
Will continue to monitor.
 faxed updates to Nikki at [facility].
 met with the patient and her , Miguel. They are refusing
the hospitalist recommendation of post acute rehab. The patient states her
son and daughter live two blocks from their home and they assist. They state
that last time the patient was in rehab she was in tears because it was so
bad and will not return.  They were agreeable to [facility]. Will continue to
monitor.
 met with the patient to discuss HHS. WALDEMAR presented Medicare.gov's
list of HHAs in the [place] area. The patient chose [facility]. WALDEMAR
faxed referral. WALDEMAR presented the IM form to the patient. The patient
understood and gave WALDEMAR permission to sign the form. A copy was provided to the
patient and the original was placed in the chart.
 updated Beba with Filippo PALMA.
 updated Filippo PALMA on patient status.
Aide obtained vital signs via machine and pulse high per machine. This nurse
manually counts radial pulse and gets 79 for heart rate. Patient says she is
having minimal pain at this time, 2/10 in abd. Uses the epidural as needed.
Says that she was able to take a little bit of a nap and is not feeling too
bad at this time. Encouraged patient to cough and take deep breaths, patient
does at this time. Patient declines any further needs or concerns at this
time.
Ambulatory with assist to bathroom for bowel movement.
BLISTERS UP TOP OF RT BUTTOCK BUSTED OPEN WHEN PT SAT DOWN ONTO BSC. PATTED
DRY AND AQUECELL DRESSING IN PLACE AT THIS TIME.
Contacted Kayla the hospitalist and she came to assesst he pt. The pt was
complaining of upper abdomen pain and she has having a lot of nausea
throughout the night. After trying all that she could have for nausea and
trying a cool pack and a warm blanket on her abdomen, nothing seemed to be
helping. Kayla contacted Dr. Fiore and she ordered a NG tube to be placed at
this time, a 14 fr NG was placed. As of this time which is 0440 there is 450cc
in the canister. Pt did not tolerated the NG placement. She did state that it
was very painful. Pt also has an order for a CT of the abdomen this morning. I
called Radiology and once they are able I will transport pt down to have the
CT done. Pt has been informed of everything that has been done and what the
plan is for now. Pt has her call light and her pain button wtihin reach at
this time.
Continues to moan out loud, reports "I just feel blah". No specific complaint,
denies nausea and reports pain to left abdomen "just a little". Using Epidural
pain button as needed.
Discharge instructions reviewed with patient and spouse, verbalized
understanding.  Discharged via wheelchair to auto/home with spouse at 1200.
First visit from the .  No needs right now.
Initial visit; Patient thanked  for looking in on her and offering
encouragement, comfort and prayer.
Initial visit; Patient thanked  for looking in on her and providing
Spiritual Care. Following prayer patient was receptive to  contacting
her Denominational letting them know of her hospitalization.  did so and also
changed her Admission records Anabaptist Preference from No Preference to
Catholic.
KUB done. LINA foreman per order from Dr. Orozco.
Lying in bed in supine position with eyes closed. Respirations even and
unlabored. No signs or symptoms of discomfort noted at this time.
Lying in bed with eyes closed. Patient has clear liquid lunch tray. Patient
says that she does not have much of an appetite. Encourage patient to take in
what she can take in but not to force it. Having minimal pain at this time.
Patient repositioned in bed to comfortable position. Denies additional needs
at this time.
Lying in bed with eyes open. Denies pain. Explains that she had a loose BM in
her brief that she could not control. Reassurance provided and patient cleaned
at this time. Mariee to dependent drainage draining clear yellow urine. Mid abd
incision with staples intact, edges well approximated, no
redness/swelling/discharge. Patient does have a area to right lower back
covered with a Mepilex. Patient denies additional needs at this time.
Lying in bed with eyes open. Having minimal pain in lower abd. Patient has
epidural, no redness/swelling/discharge at site, tape intact. Dressing to mid
abd incision intact with old drainage noted through the dressing. Patient says
that she has not been able to pass gas at this time. Denies any additional
needs.
Lying in bed with eyes open. Having some discomfort in abd. Asked patient if
she has used her buttong for her epidural. Patient says that she has not and
uses it at this time. Patient said she was having some nausea earlier but has
resolved at this time. Patient denies additional needs.
NG tube removed by surgeon late this morning. No nausea or vomiting reported.
Patient has tolerated clear liquids well. Mariee in place per order. Patient
ambulated in halls with therapy. Patient denies further needs at this time,
call light within reach.
NOTED WITH [facility] AFTER WASTIN 20ML, SOME WHITE PARTICLES FLOATING IN
BLOOD SAMPLE, DISCUSSED WITH DR MERRILL WITH [facility], NEW ORDER RECEIVED.
Nikki, at [facility], reports that they would be able to accept the patient for
a skilled stay and that they would not require a COVID test. Nikki states
that they do a COVID test on the patient when they arrive to their facility.
Nikki reports that they would require a DPOA-HC, if the patient is unable to
sign for her own paperwork. WALDEMAR attempted to meet with the patient to update
and discuss a DPOA-HC. The patient had a puke bag up to her mouth and was
moaning. SW to follow up with her at a later time. WALDEMAR then contacted and
updated the patient's , Miguel. Miguel reports that his wife does not
need to be getting intensive therapy and can do exercises at home. He states
that they really are not interested in post-acute rehab now. WALDEMAR discussed how
we can keep [facility] updated, in the event that the patient is not safe to
return home. The patient's  was agreeable to this, but reports that he
would like for her to return home with him. SW to continue to follow.
PER MT, PT HAS CONVERTED TO SR IN THE 70s. PT REMAINS ASLEEP AT THIS TIME. NO
ACUTE S/S OF DISTRESS NOTED. VSS.
PT AMBULATES IN HALLWAY WITH ASSIST OF ONE, GAIT BELT AND WALKER. EPIDURAL
CATHETER INTACT, FOAM WITH DRY DRAINAGE.
PT ARRIVES TO ICU 8 VIA STRETCHER ON RA AND TRASNFERS X2 ASSIST TO ICU BED,
SLOW STEADY GAIT NOTED. PT DOES C/O LEFT LEG BEING WEAK AND THIGH ACHY. NOTED
MIDLINE INCISION NO REDNESS OR DRAINAGE AND STAPLES INTACT. AMIO BOLUS STARTED
WITH TWO RN VERIFICATION WITH RUSTY BECKHAM. PT PLACED ON BEDSIDE CONTINUOUS
MONITOR. PT EDUCATED ON TRANSFER, AMIO, AND CALL LIGHT. VERBALIZED
UNDERSTANDING. PT DOES C/O SOME PAIN IN ABDOMEN 4/10 BUT STATES IS TOLERABLE.
FC PATENT AND DRAINING TO GRAVITY. SCDs IN PLACE. PT CHANGED TO YELLOW GOWN
AND FALL RISK SIGN AND BRACELET IN PLACE. NOTED HR TO BE IRREGULAR FROM
120/140s AT THIS TIME. PT STATES THAT SHE DOES NOT FEEL IT. WILL MONITOR
CLOSELY. NGT TO LT NARE AT 55 CM PLACED TO LIS.
PT HAS INCONTINENT STOOL IN BED. PATIENT UPSET THAT SHE WASN'T ABLE TO CONTROL
IT. PENA CATHETER IN PLACE WITH YELLOW URINE DRAINING. HAS RT PICC WITH TPN
INFUSING. ABDOMEN WITH ACTIVE BOWEL SOUNDS, SMALL MIDLINE WITH STAPLES INTACT.
HS MEDS GIVEN. DENIES PAIN AT THIS TIME.
PT IN BED. REPORTS GAS PAINS. HAS EPIDURAL INFUSING, HAS SL TO LEFT FOREARM,
FLUSHES EASILY. WEARING OXYGEN AT 2L/NC. MIDLINE INCISION NOTED. IS ALERT AND
ORIENTED X4.
PT PLACED ON 2L VIA NC. POX WAS DROPPING TO 88% WHILE PT WAS TRYING TO REST,
POX INCREASES TO 95% WITH O2.
PT YELLING FOR HELP, STANDING AT BEDSIDE, NEEDS TO HAVE BM. ASSISTED TO BSD.
HAS SMALL SOFT BM, BACK TO BED WITH HELP. COMPLAINS OF ABDOMINAL PAIN,
MEDICATED WITH MORPHINE 4MG IVP FOR PAIN 7/10.
PT is now recommmending home if able. OT is recommending that the patient will
likely require post-acute rehab. WALDEMAR met with the patient and the patient's
, Miguel, to discuss this. Miguel reports that the patient has been
wobbling while walking. The patient and her  report that they hope to
be able to safely return home, but would be agreeable to post-acute rehab if
she does not make progress. The patient and her  prefer [facility].
They did not have a second preference at this time and would like some time to
think about their second preference. SW contacted and faxed a referral to Nikki
at [facility]. SW awaiting their screen and will continue to follow.
PTin room to work with the patient.
Patient alert and oriented, answers questions appropriately.  See assessment.
Abdomen soft, non tender, non distended.  Bowel sounds active x4 quads.
+Flatus. +Bowel movement.  Urinating adequate amounts. Abdominal incisions
with edges well approximated, no redness or drainage noted.  Post op exercises
reviewed with patient.  No c/o at this time.
Patient ambulating in halls with PT and use of walker. Gait slow and steady.
Patient grunts at times.
Patient arrived to surgical floor via EMS. NG in place, placed to LIS, clear
drainage with couldy brown strands immediately into suction cannister. Patient
is dry heaving as the suction was unhooked during transport. Patient is alert
and oriented. Patient is able to participate in admission assessment but
cannot tell me what medications she takes and did not bring a medication list.
Called patient's  to get medications but he seemed unsure. Called
patient's pharmacy and got a complete medication list.
Patient assisted back to bed. Denies pain at this time. Warm blankets
provided. Patient denies additional needs at this time.
Patient assisted up to chair, she had a little breakfast. She is weak.  She is
feeling "bloated" this am. She is belching. Her abdomen is distented, bowel
sounds heard. midline incision edges well approximated. Scds. Air mattress
applied to bed, heel protectors provided, skin integrety a concern. Dyspnea
with exertion & at rest. O2 on. Picc to Rue with Tpn per orders. Tele on Vss.
 rounded & plan of care reviewed. Will closely monitor
Patient explains that cold liquids cause her stomach to increase in pain.
Provided room temp water to the patient. Was nauseated when attempting to eat
but better at this time. Having some pain in abd and will use the epidural
pump as needed. Would like to allow stomach to settle at this time and then
will go for a walk later. Denies additional needs.
Patient has been doing well this afternoon. SHe is getting potassium
replacement IV. She got a PICC line with intentions to get TPN tomorrow.
Patient has not epidural discontinued. IVF's infusing. She had 700ml of dark
green drainage out this shift. Denies nausea. Explained to notify us she
starts to have pain. She is passing flatus this afternoon. While giving
bedside shift report she would like to try having a bowel movement. The CNA's
are helping her. Dr Moreno has not seen patient yet, he was in the hospital
earlier but did not stop in her room. Attempted to call again but did not get
a hold him. She continues to be in afib. PRN dose of metoprolol given once
this afternoon as ordered for heart rate of 125. No other changes at this
time. Call light within reach.
Patient has been up to the bathroom. Steady gait with walker. Spoke with
Hospitalist Bear Arrington. Ultram orders for pain. Mariee to  be removed once
24hr urine collected. Patient ambulated the halls this afternoon with o2.
dyspnea with exertion, she expressed she felt like she ran 10 miles. Will
monitor.
Patient has been very naueous this morning. She has been given zofran and
reglan. The reglan seems to be working better. She was finally resting
comfortably after getting it. Her IV infiltrated this morning. New IV started
to left forearm. Staples to abdomen are intact. No reddness, no drainage to
incision. Mariee secured to leg, urine yellow and clear, output continues to be
low. Minimal complaints of pain, she stated her pain has been controlled with
the epidural. No other changes at this time. Call light within reach.
Patient has done well throughout the day. Continues to deny pain. Patient
repositions in bed independently. Has been up to commode multiple times today.
TPN continues to infuse per orders. Mariee maintained to dependent drainage.
Denies further needs at this time. Will report off to night shift.
Patient has rested well throughout the night. Complained of pain 6/10 at the
beginning of the shift and PRN Morphine administered. This was effective. PICC
line to WARD continues to have TPN infusing. Mepilex noted to left back.
Patient continues on 1L of oxygen via nasal cannula. Mariee catheter continues
to drain clear, yellow urine. Midline incision appears to be healing well.
Staples have been removed. Patient takes medications one at a time with water.
No difficulties noted. Will continue to monitor patient.
Patient has rested well throughout the night. During assessment, patient noted
to be alert and oriented. She called out and requested help getting into bed.
When this nurse came into her room, patient stated that everyone had left her
and that she didn't know what to do. This nurse reoriented patient and
assisted her into bed. Gait noted to be slow and steady. Utilizes walker for
ambulation. Able to use the restroom independently and demonstrates correct
twyla-care. Patient noted to grimace when ambulating, but refuses pain
medication when offered. Bed alarms on d/t intermittent confusion. Will
continue to monitor.
Patient has rested well throughout the night. Midline incision is open to air.
Staples are intact. No redness or drainage to site. Epidural continues.
Patient denies pain. Bowel sounds are hypoactive. Denies gas. Mariee continues
to drain clear, yellow urine. Continues to cough and deep breathe as directed.
Will continue to monitor patient.
Patient in bed resting. Alert and oriented. Assesssment complete. PICC line to
WARD without complications, TPN infusing per orders. Midline incision with
staples intact. Patient states mid epigastric discomfort when taking PO
fluids. Mariee to dependent drainage with clear yellow urine in bag. Patient on
1.5 of oxygen via NC. Denies further needs at this time.
Patient in bed resting. Denies further needs at this time.
Patient is finally resting. Her nausea has been better since getting
phenergan. She has been made NPO. Her urine output has been 450 out this
shift. Her pain continues to be controlled with the epidural. She did not get
up today because she did not think could tolerate it. No other changes at this
time. Call light within reach.
Patient left floor with pre op nurse via bed. Patient to OR for scheduled ex
lap. Surgeon called patient's  to inform him of planned procedure.
Patient off floor to endoscopy with periop staff via bed. Patient denied
questions or needs.
Patient refusing lunch, States that her taking po other than water is the
reason she is having diarrhea.
Patient resting in bed at this time. Patient is alert and oriented, answers
questions appropriately. Epidural in place, patient reports that pain is
controlled at this time. NG tube to left nare to LIS per order, no significant
output since shift change. Patient reports that she is thirsty, provided mouth
swabs and a small amount of water to moisten her mouth and informed patient
that NPO order is still in place. Patient verbalized understanding and denies
further needs at this time, call light within reach.
Patient resting in bed. Denies further needs at this time.
Patient resting in bedside recliner at this time. Patient is dozing but rouses
easily. IVF continue per order. Patient is NPO for ERCP at noon, AM
medications held per hospitalist order. Patient denies needs at this time,
call light within reach.
Patient resting in chair. She was up to the bathroom. passed flatus, no stool.
Patient adams was removed per orders once 24 hours urine completed. Patient
had minimal interest in dinner. Encouraged her to drink ensure. Bedside report
to Francia Cooney
Patient returned to floor from endoscopy at approximately 1340. Patient is
alert and oriented and pain is well controlled. IVF continue per order. VS
WNL. Patient tolerating clear liquids well, denies further needs at this time,
call light within reach.
Patient sitting up in chair, eating lunch. Her  at bedside. Social work
has rounded & discussed discharge planning. No interest in a rehab facility,
they want to discharge home.
Patient sitting up in recliner, spouse at bedside.
Patient up to BSC with x 1 assist. States she feels like she needs to go to
the restroom or pass gas. Was unable to do so. Smears noted. No further needs
at this time.
Patient up to BSC. Had medium loose brown stool. Patient straining to have
bowel movement and small rectal prolapse noted. Pushed back into rectum.
Explain to the patient to not strain when having bowel movements as it is
causing her to develop a prolapse. Patient voices understanding. Assisted back
to bed into comfortable position. Denies additional needs at this time.
Patient up to [facility], had medium soft formed stool. Encouraged patient to attempt
increase oral intake.
Patient up to bedside comode, small loose BM
Patients heart is irregular this morning. Called for an EKG, notified Dr Avalos. EKG showed AFIB with RVR. Order for cardiology consult done, Dr Moreno notified. Patient is being started on telemetry. Dr Moreno will
come see the patient later this morning. Patient denies any symptoms, denies
chest pain. No other changes at this time. NG still to low intermitten
suction. Drainage is dark green. Patients abdomen is less distended today. She
is resting comfortably at this time. Call light within reach.
Pt awake, has pain in abdomen, re-oriented to use of Epidural.
Pt belongings were gathered and pt was getting ready to be transferred to ICU.
Pt was a little sad when thinking of all that has changed in the last couple
of days. I informed the pt that I did speak with her  and he knows that
she will be transferred. Pt was undpated on all that we will do to get her
transferred. Pt has new sheets and applied to her bed at this time. Pt feels
better know and is ready to transfer. Junior Valentine RN helped the
aide to transport pt to ICU.
Pt grahametnly resting in bed. Pt heart rate has been up to 150 she is
currentlly at 135. Dr. Chakraborty was just notitifed he informed me that he
would get back to me on her. Pt has her call light within reach and her bed is
in lowest position.
Pt heart rate on the tele monitor was noted to be in the 150's and back down
to like 118. Dr. Moreno was contacted at this time to update him on the pts
status.
Pt in bed, denies pain. TPN infusing per PICC. No further BM's.
Pt is currently back in bed. Pt did call a little while ago and requested to
sit on the toliet. Pt has been given nausea medication when she was able. Pt
has complained of alot of stomach pain and she has had nausea and has been
vomitting. Pt has been given all that she can have for nausea medication. Pt
is still stated that she has never had so much pain and felt so bad. Pt was
encouraged to press her pain button when she feels that she needs it for pain.
Pt has her call light [facility]. She was informed that I would contact the
hospitalist to inform her about the change in the pts condition.
Pt is currently back in her room. Pt was taken down for a CT of the abdomen.
Pt has had a total of 850 out of her NG tube. Pt is currently resting in her
bed. She has her call light within reach and her bed is in lowest position.
Pt is currently sitting up in bed. Pt  was contacted and I informed him
on her status and that she would be transfered to ICU. Pt was also informed
about the changes in her status and why she was being transferred. Pt
currently has a adams in place with yellow clear urine. Pt NG tube is in place
and its hooked up to low intermittent suction. Pt has had one bowel movement
since the shift has begin. Dr. Moreno was contacted and he wanted her MICU
status. House supervisor has also contacted him. Report was given to RUSTY Rivera down in ICU. I did inform her to verify all orders with Dr. Moreno. Pt
will be transferred soon pt is currently using the restroom. Pt has been a
two person assists marisa.
Pt is currently sleeping in bed. Pt has not comlained of any pain throughout
the shift. Pt has been up a couple of times to use the bedside commode. Pt has
her call light within reach.
Pt up to BSC for small loose BM.
RECEIVED REPORT FROM RUSTY COLORADO. PT SLEEPING AT THIS TIME ON 3L VIA NC. FC
PATENT AND DRAINING TO GRAVITY. CALL LIGHT WITHIN REACH. VSS.
RECEIVED REPORT FROM RUSTY ESPINAL FROM SURGICAL. AWAITING ARRIVAL OF PT TO ICU
8.
RECEIVED REPORT FROM RUSTY VELAZCO. PT SITTING UP IN RECLINER ON 3L VIA NC.
VSS. CALL LIGHT WITHIN REACH. FC PATENT AND DRAINING TO GRAVITY. NOTED NGT TO
LT NARE AT 55 CM, CLAMPED.
REPORT GIVEN TO RUSTY RAINES ON SURGICAL FLOOR. PATIENT TAKEN UP TO SURGICAL ROOM
347
Received patient from ICU. Alert. Hard of hearing. No c/o pain or nausea. NG
clamped. TPN infusing per right PICC.
Received report from RUSTY Jackson. Pt lying in bed awake pt has call light within
reach and her bed is in lowest position.
Received report from RUSTY Mckoy. Pt currently sitting up and bed and has not
compalints at this time Pt has her call light wtihin reach and her bed is in
lowest position.
Report was given by RUSTY Mckoy. Pt is currently lying in bed. Pt has her
call light and her bed is in lowest position. Pt stated that she feels much
better today.
Reported off to RUSTY Jara. Pt is currently awake in bed. Pt was assisted with
getting cleaned up in bed. Pt is currently lying in bed and her call light is
within reach.
SPOKE TO DR MARTINEZ FOR CLARIFICATION ON AMIO GTT ORDERS, SEE MAR.
Sitting up in bed attempting to eat clear liquid dinner. Patient explains that
she does not have much of an appetite. Explain that she does not have to force
herself to eat all of the food. Patient says that she is done at this time.
Patient continues to cough and deep breathe. Denies any additional needs or
concerns at this time.
Sitting up in bed talking with . Voices no further needs at this time.
Sitting up in chair with eyes open, spouse in room with the patient. NOREEN Galarza, in to see the patient. Encouraged use of incentive spirometer, patient
shows proper use of incentive spirometer. Mid abd incision with edges well
approximated, no redness/swelling/discharge. Site cleaned with alcohol pads.
Removed 13 staples without difficulty. Patient tolerates well. Denies any
additional needs at this time.
Sitting up in chair. Able to tolerate about 20% of low fiber lunch. Remains
sitting up in chair at this time, spouse remains with patient. Patient denies
any additional needs at this time.
The patient is to discharge home today, 8/13 with [facility] and receiving
PT/OT/Nursing services. WALDEMAR presented the IM form to the patient. She
understood and gave WALDEMAR permission to sign on her behalf. A copy was provided
to the patient and the original was placed in the chart. WALDEMAR faxed orders to
[facility]eba at [facility]. There are no additional needs at this time.
WDL

## 2023-05-02 ENCOUNTER — HOSPITAL ENCOUNTER (EMERGENCY)
Dept: HOSPITAL 19 - COL.ER | Age: 88
Discharge: HOME | End: 2023-05-02
Payer: MEDICARE

## 2023-05-02 VITALS — HEART RATE: 50 BPM | DIASTOLIC BLOOD PRESSURE: 60 MMHG | SYSTOLIC BLOOD PRESSURE: 106 MMHG

## 2023-05-02 VITALS — TEMPERATURE: 97.9 F

## 2023-05-02 VITALS — HEIGHT: 60 IN | WEIGHT: 103.18 LBS | BODY MASS INDEX: 20.26 KG/M2

## 2023-05-02 DIAGNOSIS — R53.81: ICD-10-CM

## 2023-05-02 DIAGNOSIS — M25.551: ICD-10-CM
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
## 2023-05-02 DIAGNOSIS — Z88.6: ICD-10-CM

## 2023-05-02 DIAGNOSIS — M40.205: ICD-10-CM

## 2023-05-02 DIAGNOSIS — R53.1: ICD-10-CM

## 2023-05-02 DIAGNOSIS — R94.5: ICD-10-CM

## 2023-05-02 DIAGNOSIS — S80.01XA: Primary | ICD-10-CM
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
## 2023-05-02 DIAGNOSIS — R79.89: ICD-10-CM

## 2023-05-02 DIAGNOSIS — Z88.5: ICD-10-CM

## 2023-05-02 LAB
ALBUMIN SERPL-MCNC: 3.4 GM/DL (ref 3.4–4.8)
ALP SERPL-CCNC: 162 U/L (ref 40–150)
ALT SERPL-CCNC: 333 U/L (ref 0–55)
ANION GAP SERPL CALC-SCNC: 17 MMOL/L (ref 7–16)
AST SERPL-CCNC: 266 U/L (ref 5–34)
BASOPHILS # BLD: 0 K/MM3 (ref 0–0.2)
BASOPHILS NFR BLD AUTO: 0.4 % (ref 0–2)
BILIRUB SERPL-MCNC: 0.9 MG/DL (ref 0.2–1.2)
BUN SERPL-MCNC: 32 MG/DL (ref 10–20)
CALCIUM SERPL-MCNC: 8.8 MG/DL (ref 8.4–10.2)
CHLORIDE SERPL-SCNC: 103 MMOL/L (ref 98–107)
CO2 SERPL-SCNC: 17 MMOL/L (ref 23–31)
CREAT SERPL-SCNC: 1.7 MG/DL (ref 0.57–1.11)
EOSINOPHIL # BLD: 0 K/MM3 (ref 0–0.7)
EOSINOPHIL NFR BLD: 0.1 % (ref 0–4)
ERYTHROCYTE [DISTWIDTH] IN BLOOD BY AUTOMATED COUNT: 14.2 % (ref 11.5–14.5)
GLUCOSE SERPL-MCNC: 93 MG/DL (ref 70–99)
GRANULOCYTES # BLD AUTO: 81.5 % (ref 42.2–75.2)
HCT VFR BLD AUTO: 39.3 % (ref 37–47)
HGB BLD-MCNC: 12.2 G/DL (ref 12.5–16)
KETONES UR STRIP.AUTO-MCNC: (no result) MG/DL
LYMPHOCYTES # BLD: 0.7 K/MM3 (ref 1.2–3.4)
LYMPHOCYTES NFR BLD: 7.2 % (ref 20–51)
MCH RBC QN AUTO: 27 PG (ref 27–31)
MCHC RBC AUTO-ENTMCNC: 31 G/DL (ref 33–37)
MCV RBC AUTO: 87 FL (ref 80–100)
MONOCYTES # BLD: 0.9 K/MM3 (ref 0.1–0.6)
MONOCYTES NFR BLD AUTO: 10 % (ref 1.7–9.3)
NEUTROPHILS # BLD: 7.5 K/MM3 (ref 1.4–6.5)
PH UR STRIP.AUTO: 5 [PH] (ref 5–8.5)
PLATELET # BLD AUTO: 326 K/MM3 (ref 130–400)
PMV BLD AUTO: 9.7 FL (ref 7.4–10.4)
POTASSIUM SERPL-SCNC: 4.1 MMOL/L (ref 3.5–4.5)
PROT SERPL-MCNC: 6.4 GM/DL (ref 6.2–8.1)
RBC # BLD AUTO: 4.52 M/MM3 (ref 4.1–5.3)
RBC # UR STRIP.AUTO: (no result) /UL
RBC # UR: (no result) /HPF (ref 0–2)
SODIUM SERPL-SCNC: 137 MMOL/L (ref 136–145)
SP GR UR STRIP.AUTO: 1.01 (ref 1–1.03)
SQUAMOUS # URNS: (no result) /HPF (ref 0–10)
TROPONIN I SERPL-MCNC: 0.01 NG/ML (ref 0–0.03)
UA DIPSTICK PNL UR STRIP.AUTO: (no result)
URN COLLECT METHOD CLASS: (no result)
UROBILINOGEN UR STRIP.AUTO-MCNC: 1 E.U/DL (ref 0.2–1)

## 2023-05-03 NOTE — NUR
attempted to contact Patient and her contacts in order to
follow-up on connecting her with home health services. there was no answer, SW
was unable to leave a voicemail.